# Patient Record
Sex: MALE | Race: WHITE | Employment: OTHER | URBAN - METROPOLITAN AREA
[De-identification: names, ages, dates, MRNs, and addresses within clinical notes are randomized per-mention and may not be internally consistent; named-entity substitution may affect disease eponyms.]

---

## 2019-12-17 ENCOUNTER — OFFICE VISIT (OUTPATIENT)
Dept: ORTHOPEDIC SURGERY | Age: 44
End: 2019-12-17
Payer: COMMERCIAL

## 2019-12-17 VITALS — WEIGHT: 230 LBS

## 2019-12-17 DIAGNOSIS — M67.51 SYNOVIAL PLICA SYNDROME OF RIGHT KNEE: Primary | ICD-10-CM

## 2019-12-17 DIAGNOSIS — M79.4 HYPERTROPHY OF FAT PAD OF KNEE: ICD-10-CM

## 2019-12-17 PROCEDURE — 99213 OFFICE O/P EST LOW 20 MIN: CPT | Performed by: PHYSICIAN ASSISTANT

## 2019-12-17 ASSESSMENT — ENCOUNTER SYMPTOMS
CONSTIPATION: 0
DIARRHEA: 0
CHEST TIGHTNESS: 0
COLOR CHANGE: 0
ABDOMINAL DISTENTION: 0
VOMITING: 0
SHORTNESS OF BREATH: 0
ABDOMINAL PAIN: 0
COUGH: 0
NAUSEA: 0
APNEA: 0

## 2019-12-23 ENCOUNTER — TELEPHONE (OUTPATIENT)
Dept: ORTHOPEDIC SURGERY | Age: 44
End: 2019-12-23

## 2019-12-24 ENCOUNTER — OFFICE VISIT (OUTPATIENT)
Dept: ORTHOPEDIC SURGERY | Age: 44
End: 2019-12-24
Payer: COMMERCIAL

## 2019-12-24 VITALS
BODY MASS INDEX: 31.81 KG/M2 | HEART RATE: 93 BPM | DIASTOLIC BLOOD PRESSURE: 88 MMHG | HEIGHT: 73 IN | SYSTOLIC BLOOD PRESSURE: 122 MMHG | WEIGHT: 240 LBS

## 2019-12-24 DIAGNOSIS — M67.51 SYNOVIAL PLICA SYNDROME OF RIGHT KNEE: Primary | ICD-10-CM

## 2019-12-24 DIAGNOSIS — M79.4 HYPERTROPHY OF FAT PAD OF KNEE: ICD-10-CM

## 2019-12-24 PROCEDURE — 20611 DRAIN/INJ JOINT/BURSA W/US: CPT | Performed by: PHYSICIAN ASSISTANT

## 2019-12-24 RX ORDER — METHYLPREDNISOLONE ACETATE 40 MG/ML
40 INJECTION, SUSPENSION INTRA-ARTICULAR; INTRALESIONAL; INTRAMUSCULAR; SOFT TISSUE ONCE
Status: COMPLETED | OUTPATIENT
Start: 2019-12-24 | End: 2019-12-26

## 2019-12-24 RX ORDER — LIDOCAINE HYDROCHLORIDE 10 MG/ML
4 INJECTION, SOLUTION INFILTRATION; PERINEURAL ONCE
Status: COMPLETED | OUTPATIENT
Start: 2019-12-24 | End: 2019-12-26

## 2019-12-24 SDOH — HEALTH STABILITY: MENTAL HEALTH: HOW OFTEN DO YOU HAVE A DRINK CONTAINING ALCOHOL?: NEVER

## 2019-12-24 ASSESSMENT — ENCOUNTER SYMPTOMS
DIARRHEA: 0
NAUSEA: 0
COUGH: 0
ABDOMINAL PAIN: 0
ABDOMINAL DISTENTION: 0
SHORTNESS OF BREATH: 0
CHEST TIGHTNESS: 0
VOMITING: 0
COLOR CHANGE: 0
CONSTIPATION: 0
APNEA: 0

## 2019-12-26 RX ADMIN — LIDOCAINE HYDROCHLORIDE 4 ML: 10 INJECTION, SOLUTION INFILTRATION; PERINEURAL at 06:22

## 2019-12-26 RX ADMIN — METHYLPREDNISOLONE ACETATE 40 MG: 40 INJECTION, SUSPENSION INTRA-ARTICULAR; INTRALESIONAL; INTRAMUSCULAR; SOFT TISSUE at 06:22

## 2021-07-06 ENCOUNTER — OFFICE VISIT (OUTPATIENT)
Dept: ORTHOPEDIC SURGERY | Age: 46
End: 2021-07-06
Payer: COMMERCIAL

## 2021-07-06 VITALS
HEART RATE: 93 BPM | DIASTOLIC BLOOD PRESSURE: 80 MMHG | SYSTOLIC BLOOD PRESSURE: 120 MMHG | TEMPERATURE: 98.2 F | RESPIRATION RATE: 13 BRPM | HEIGHT: 73 IN | BODY MASS INDEX: 33 KG/M2 | WEIGHT: 249 LBS

## 2021-07-06 DIAGNOSIS — S83.91XD SPRAIN OF RIGHT KNEE, UNSPECIFIED LIGAMENT, SUBSEQUENT ENCOUNTER: ICD-10-CM

## 2021-07-06 DIAGNOSIS — M25.561 RIGHT KNEE PAIN, UNSPECIFIED CHRONICITY: Primary | ICD-10-CM

## 2021-07-06 DIAGNOSIS — M67.51 PLICA SYNDROME OF RIGHT KNEE: Primary | ICD-10-CM

## 2021-07-06 PROCEDURE — 99213 OFFICE O/P EST LOW 20 MIN: CPT | Performed by: PHYSICIAN ASSISTANT

## 2021-07-06 ASSESSMENT — ENCOUNTER SYMPTOMS
NAUSEA: 0
COLOR CHANGE: 0
ABDOMINAL PAIN: 0
CONSTIPATION: 0
SHORTNESS OF BREATH: 0
CHEST TIGHTNESS: 0
COUGH: 0
APNEA: 0
ABDOMINAL DISTENTION: 0
DIARRHEA: 0
VOMITING: 0
RESPIRATORY NEGATIVE: 1

## 2021-07-06 NOTE — PROGRESS NOTES
[place] [id number] AND SPORTS MEDICINE  [address] [place] [address] [address] [address]  Dept: [phone]  Dept Fax: [phone]          Right Knee - Follow Up- [facility] injury    Subjective:     Chief Complaint   Patient presents with    Follow-up     R Knee Pain        CLAIM#: [id number]  DOI: 08/21/2003  DIAG: S73.101A Sprain hip & Thigh              S83. 91XA Sprain of knee & leg              S83. 8X1A Sprain or strain quad                C22.18 Plica syndroe              C75.5 Scar & Fibrosis               M79.4 Hypertrophy of fat pad  HPI:     Chela He presents today for Right knee pain. The pain has been present since 08/21/2003. The patient recalls a [facility] specific injury where several hundred pounds and he ruptured his quadricep tendon. The patient has tried ibuprofen, a brace, heat, ice and rest with no pain relief. The pain is now described as throbbing and achy. There is pain on weight bearing. The knee has swelled. There is painful popping and clicking. The knee has caught or locked up. The knee has not given out. It is stiff upon arising from sitting. It is painful to go up and down stairs and sit for a prolonged time. Patient states that the pain has stopped him from playing sports, working out, cutting his lawn and getting a good nights sleep. The patient has had a cortisone injection on 12/17/2019 with good pain relief. The patient has not tried a lubrication injection. The patient has tried physical therapy and he does home exercises with mild improvement. The patient has had surgery in 2005, not by Dr. Iraj Vega. The opposite knee is okay. He been having increased knee pain. He was unable to come in due to Covid. He did do an independent medical exam by a physician that was suggested by his . I do not have any records of that appointment. ROS:   Review of Systems   Constitutional: Positive for activity change.  Negative for appetite change, fatigue and fever. Respiratory: Negative. Negative for apnea, cough, chest tightness and shortness of breath. Cardiovascular: Negative. Negative for chest pain, palpitations and leg swelling. Gastrointestinal: Negative for abdominal distention, abdominal pain, constipation, diarrhea, nausea and vomiting. Genitourinary: Negative for difficulty urinating, dysuria and hematuria. Musculoskeletal: Positive for arthralgias. Negative for gait problem, joint swelling and myalgias. Skin: Negative for color change and rash. Neurological: Negative for dizziness, weakness, numbness and headaches. Psychiatric/Behavioral: Negative for sleep disturbance. Past Medical History:    No past medical history on file. Past Surgical History:    No past surgical history on file. CurrentMedications:   No current outpatient medications on file. No current facility-administered medications for this visit. Allergies:    Penicillins    Social History:   Social History     Socioeconomic History    Marital status:      Spouse name: None    Number of children: None    Years of education: None    Highest education level: None   Occupational History    None   Tobacco Use    Smoking status: Never Smoker    Smokeless tobacco: Never Used   Vaping Use    Vaping Use: Never used   Substance and Sexual Activity    Alcohol use: Never    Drug use: Never    Sexual activity: None   Other Topics Concern    None   Social History Narrative    None     Social Determinants of Health     Financial Resource Strain:     Difficulty of Paying Living Expenses:    Food Insecurity:     Worried About Running Out of Food in the Last Year:     Ran Out of Food in the Last Year:    Transportation Needs:     Lack of Transportation (Medical):      Lack of Transportation (Non-Medical):    Physical Activity:     Days of Exercise per Week:     Minutes of Exercise per Session:    Stress:     Feeling of Stress :    Social Plica syndrome of right knee    2. Sprain of right knee, unspecified ligament, subsequent encounter      Procedures:    Procedure: no  Radiology:   KNEE X-RAY    4 views of the right knee including AP, bilateral tunnel, and lateral in the upright position, and skyline views reveal anatomic alignment with no fracture or dislocation. Kellgren grade II changes of osteoarthritis (joint space narrowing, osteophyte, subchondral sclerosis, bony deformity/cyst) of the tricompartment(s). No osseous loose bodies. No bony erosion or periosteal reaction. No soft tissue masses. Spurring of the lateral pole of the patella. Impression: Mild degenerative changes of the right knee. Plan:   Treatment : I reviewed the X-ray with the patient and I informed them that the x-ray looks similar to his previous x-ray. We discussed the etiologies and natural histories of plica pain and mild degenerative changes of the right knee. We discussed the various treatment alternatives including anti-inflammatory medications, physical therapy, injections, further imaging studies and as a last result surgery. During today's visit, we discussed that he is having more pain in his knee and swelling. He is able to do less activities due to his knee pain. 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 Since has been many years since we have had an MRI of his right knee I think it be reasonable to admit a C9 for a new MRI of his right knee to see if he is any more degenerative changes of his kneecap due to his quadricep tendon tear. We can also put in another C9 for a cortisone injection into the right knee so depending on what the results of the MRI are we could consider doing another cortisone injection which does provide relief for short period of time. He is concerned about continuing to have cortisone injection into the knee in case it causes damage. The patient has opted for submitting to C9's.   One for a cortisone injection and a second for a new MRI. When you have had a traumatic knee injury that caused a tendon tear in the knee that could lead to tracking issues of the kneecap which could then in turn cause increased arthritis of the patellofemoral joint. A physical therapy prescription was not given. A Rx was not given. Patient should return to the clinic after his MRI to follow up with Elsa Gauthier D.O. . The patient will call the office immediately with any problems. No orders of the defined types were placed in this encounter. No orders of the defined types were placed in this encounter. Adela Sewell PA-C, have personally seen this patient, reviewed the chart including history, and imaging if done. I personally  performed the physical exam and obtained any needed additional history. I placed orders, performed or supervised procedures and developed the treatment plan.     Electronically signed by Anna Kemp PA-C, on 7/6/2021 at 4:36 PM      Electronically signed by Anna Kemp PA-C, on 7/6/2021 at 4:33 PM

## 2021-08-03 ENCOUNTER — TELEPHONE (OUTPATIENT)
Dept: ORTHOPEDIC SURGERY | Age: 46
End: 2021-08-03

## 2021-08-03 ENCOUNTER — PROCEDURE VISIT (OUTPATIENT)
Dept: ORTHOPEDIC SURGERY | Age: 46
End: 2021-08-03
Payer: COMMERCIAL

## 2021-08-03 VITALS
HEART RATE: 94 BPM | RESPIRATION RATE: 14 BRPM | HEIGHT: 73 IN | DIASTOLIC BLOOD PRESSURE: 81 MMHG | WEIGHT: 249 LBS | BODY MASS INDEX: 33 KG/M2 | SYSTOLIC BLOOD PRESSURE: 127 MMHG

## 2021-08-03 DIAGNOSIS — M67.51 PLICA SYNDROME OF RIGHT KNEE: Primary | ICD-10-CM

## 2021-08-03 PROCEDURE — 20611 DRAIN/INJ JOINT/BURSA W/US: CPT | Performed by: PHYSICIAN ASSISTANT

## 2021-08-03 RX ORDER — LIDOCAINE HYDROCHLORIDE 10 MG/ML
4 INJECTION, SOLUTION INFILTRATION; PERINEURAL ONCE
Status: COMPLETED | OUTPATIENT
Start: 2021-08-03 | End: 2021-08-03

## 2021-08-03 RX ORDER — METHYLPREDNISOLONE ACETATE 40 MG/ML
40 INJECTION, SUSPENSION INTRA-ARTICULAR; INTRALESIONAL; INTRAMUSCULAR; SOFT TISSUE ONCE
Status: COMPLETED | OUTPATIENT
Start: 2021-08-03 | End: 2021-08-03

## 2021-08-03 RX ADMIN — METHYLPREDNISOLONE ACETATE 40 MG: 40 INJECTION, SUSPENSION INTRA-ARTICULAR; INTRALESIONAL; INTRAMUSCULAR; SOFT TISSUE at 12:52

## 2021-08-03 RX ADMIN — LIDOCAINE HYDROCHLORIDE 4 ML: 10 INJECTION, SOLUTION INFILTRATION; PERINEURAL at 12:52

## 2021-08-03 NOTE — PROGRESS NOTES
[address] AND SPORTS MEDICINE  [address] [place] [id number]  Dept: [phone]  Dept Fax: [phone]          Right knee Visit - Follow up    Subjective:     Chief Complaint   Patient presents with    Procedure     R Knee Injection     HPI:     Amy Evangelista presents today for Rightknee pain. Patient is here today for cortisone injections into Right knee. He had his last cortisone injection was on 12/17/2019. He has not had a lubrication injection. I have reviewed the CC, and if not present in this note, I have reviewed in the patient's chart. I agree with the documentation provided by other staff and have reviewed their documentation prior to providing my signature indicating agreement. Vitals:   /81   Pulse 94   Resp 14   Ht 6' 1\" (1.854 m)   Wt 249 lb (112.9 kg)   BMI 32.85 kg/m²  Body mass index is 32.85 kg/m². Assessment:     1. Plica syndrome of right knee          Procedure:   Procedure: Yes    Regular Knee Injection    Location: Right Knee  Procedure: I discussed in detail the risks, benefits and complications of the corticosteroid injection which included but are not limited to: infection, skin reactions, hot swollen, and anaphylaxis with the patient. Amy Evangelista verbalized understanding and they have agreed to have the corticosteroid injection into the right knee. The patient was placed in the Supine position on the exam table. The superior lateral portal was identified and marked with a ball point pen. The skin was prepped with betadine in a sterile fashion. Utilizing a [employer] ultrasound unit with a variable frequency linear transducer and clean technique with sterile gloves, a 5 cc solution containing 4 cc of 1.0% Lidocaine with 1 cc containing 40 mg of Depo-medrol  was injected. There was no resistance to the injection. The wound was cleansed and a band-aid was placed.  the patient tolerated the procedure without difficulty. Adverse reactions to the injection were discussed with the patient including signs of infection (increasing pain, redness, swelling) and the patient was instructed to call immediately if experiencing any of these symptoms. Images of the injection site were recorded throughout the procedure and are saved on the SD card which is stored in the [employer] ultrasound unit. All images were downloaded and stored in patient's chart. Plan:   Patient should return to the clinic in after his MRI to follow up with Dr. Brittany Blunt or Dr. Price Farfan. The patient will call the office immediately with any problems. Orders Placed This Encounter   Medications    lidocaine 1 % injection 4 mL    methylPREDNISolone acetate (DEPO-MEDROL) injection 40 mg       No orders of the defined types were placed in this encounter.           Electronically signed by Molina Amaral PA-C, on 8/3/2021 at 12:57 PM

## 2021-08-04 DIAGNOSIS — M67.51 PLICA SYNDROME OF RIGHT KNEE: Primary | ICD-10-CM

## 2021-08-04 DIAGNOSIS — S83.91XD SPRAIN OF RIGHT KNEE, UNSPECIFIED LIGAMENT, SUBSEQUENT ENCOUNTER: ICD-10-CM

## 2021-08-13 ENCOUNTER — HOSPITAL ENCOUNTER (OUTPATIENT)
Dept: MRI IMAGING | Facility: CLINIC | Age: 46
Discharge: HOME | End: 2021-08-15
Payer: COMMERCIAL

## 2021-08-13 DIAGNOSIS — M67.51 PLICA SYNDROME OF RIGHT KNEE: ICD-10-CM

## 2021-08-13 DIAGNOSIS — S83.91XD SPRAIN OF RIGHT KNEE, UNSPECIFIED LIGAMENT, SUBSEQUENT ENCOUNTER: ICD-10-CM

## 2021-08-13 PROCEDURE — 73721 MRI JNT OF LWR EXTRE W/O DYE: CPT

## 2021-08-13 NOTE — TELEPHONE ENCOUNTER
----- Message from Bandar Camacho PA-C sent at 8/13/2021 12:26 PM EDT -----  [facility]- needs an MRI with Dr. Claudine Melendrez

## 2021-08-13 NOTE — TELEPHONE ENCOUNTER
----- Message from Yoandy Chung PA-C sent at 8/13/2021 12:26 PM EDT -----  [facility]- needs an MRI with Dr. Antonio Schneider

## 2021-08-13 NOTE — TELEPHONE ENCOUNTER
Had a long discussion with Esha Rausch. He would like an appointment with [facility] to discuss his MR and how to proceed. He is not comfortable making an appointment with another provider at this time. Appt made for next week to discuss further recommendations.

## 2021-08-17 ENCOUNTER — OFFICE VISIT (OUTPATIENT)
Dept: ORTHOPEDIC SURGERY | Age: 46
End: 2021-08-17
Payer: COMMERCIAL

## 2021-08-17 VITALS
WEIGHT: 249 LBS | RESPIRATION RATE: 14 BRPM | HEART RATE: 90 BPM | BODY MASS INDEX: 33 KG/M2 | SYSTOLIC BLOOD PRESSURE: 126 MMHG | DIASTOLIC BLOOD PRESSURE: 89 MMHG | HEIGHT: 73 IN

## 2021-08-17 DIAGNOSIS — M79.4 HYPERTROPHY OF FAT PAD OF KNEE: ICD-10-CM

## 2021-08-17 DIAGNOSIS — L90.5 SCAR CONDITION AND FIBROSIS OF SKIN: ICD-10-CM

## 2021-08-17 DIAGNOSIS — S83.91XD SPRAIN OF RIGHT KNEE, UNSPECIFIED LIGAMENT, SUBSEQUENT ENCOUNTER: Primary | ICD-10-CM

## 2021-08-17 PROCEDURE — 99214 OFFICE O/P EST MOD 30 MIN: CPT | Performed by: PHYSICIAN ASSISTANT

## 2021-08-17 ASSESSMENT — ENCOUNTER SYMPTOMS
APNEA: 0
NAUSEA: 0
COUGH: 0
CHEST TIGHTNESS: 0
SHORTNESS OF BREATH: 0
ABDOMINAL DISTENTION: 0
COLOR CHANGE: 0
VOMITING: 0
RESPIRATORY NEGATIVE: 1
DIARRHEA: 0
CONSTIPATION: 0
ABDOMINAL PAIN: 0

## 2021-08-17 NOTE — PROGRESS NOTES
morning. He is here today for an MRI review. ROS:   Review of Systems   Constitutional: Positive for activity change. Negative for appetite change, fatigue and fever. Respiratory: Negative. Negative for apnea, cough, chest tightness and shortness of breath. Cardiovascular: Negative. Negative for chest pain, palpitations and leg swelling. Gastrointestinal: Negative for abdominal distention, abdominal pain, constipation, diarrhea, nausea and vomiting. Genitourinary: Negative for difficulty urinating, dysuria and hematuria. Musculoskeletal: Positive for arthralgias and gait problem. Negative for joint swelling and myalgias. Skin: Negative for color change and rash. Neurological: Negative for dizziness, weakness, numbness and headaches. Psychiatric/Behavioral: Positive for sleep disturbance. Past Medical History:    No past medical history on file. Past Surgical History:    No past surgical history on file. CurrentMedications:   No current outpatient medications on file. No current facility-administered medications for this visit. Allergies:    Penicillins    Social History:   Social History     Socioeconomic History    Marital status:      Spouse name: None    Number of children: None    Years of education: None    Highest education level: None   Occupational History    None   Tobacco Use    Smoking status: Never Smoker    Smokeless tobacco: Never Used   Vaping Use    Vaping Use: Never used   Substance and Sexual Activity    Alcohol use: Never    Drug use: Never    Sexual activity: None   Other Topics Concern    None   Social History Narrative    None     Social Determinants of Health     Financial Resource Strain:     Difficulty of Paying Living Expenses:    Food Insecurity:     Worried About Running Out of Food in the Last Year:     Ran Out of Food in the Last Year:    Transportation Needs:     Lack of Transportation (Medical):      Lack of Transportation (Non-Medical):    Physical Activity:     Days of Exercise per Week:     Minutes of Exercise per Session:    Stress:     Feeling of Stress :    Social Connections:     Frequency of Communication with Friends and Family:     Frequency of Social Gatherings with Friends and Family:     Attends [employer] Services:     Active Member of Clubs or Organizations:     Attends Club or Organization Meetings:     Marital Status:    Intimate Partner Violence:     Fear of Current or Ex-Partner:     Emotionally Abused:     Physically Abused:     Sexually Abused:        Family History:  No family history on file. Vitals:   /89   Pulse 90   Resp 14   Ht 6' 1\" (1.854 m)   Wt 249 lb (112.9 kg)   BMI 32.85 kg/m²  Body mass index is 32.85 kg/m². Physical Examination:     Orthopedics:    GENERAL: Alert and oriented X3 in no acute distress. SKIN: Intact without lesions or ulcerations. NEURO: Intact to sensory and motor testing. VASC: Capillary refill is less than 3 seconds. KNEE EXAM    LOCATION: Right Knee  GEN: Alert and oriented X 3, in no acute distress. GAIT: The patient's gait was observed while entering the exam room and was noted to be antalgic. The extremity is in anatomic alignment. SKIN: Intact without rashes, lesions, or ulcerations. No obvious deformity or swelling. NEURO: The patient responds to light touch throughout bilateral LE. Patellar and Achilles reflexes are 2/4. VASC: The bilateral LE is neurovascularly intact with 2/4 DP and 2/4 PT pulses. Brisk capillary refill. ROM: 0/100 degrees. There is mild effusion. MUSC: decreased quad tone  LIGAMENT: Lachman's test is Negative with Good endpoint. Anterior drawer Negative. Posterior drawer Negative. There is No varus instability at 0 degrees and No varus instability at 30 degrees. There is No valgus instability at 0 degrees and No valgus instability at 30 degrees.   SPECIAL: Sonia test is negative with no clunks, no No acute fracture or dislocation. Mild tricompartmental osteophyte spurring. Articular cartilage of the lateral compartment appears grossly intact without focal chondral defect. Diffuse grade 2 medial compartment chondromalacia. Diffuse grade 2-3 patellofemoral chondromalacia. BONE MARROW: Bone marrow signal intensity within the visualized osseous structures is within normal limits. SOFT TISSUES: Mild edema in the subcutaneous fat about the knee. No sizable Baker's cyst.  Fluid in the popliteus hiatus. Visualized popliteal neurovascular bundle grossly unremarkable. 1. Thinning of the ACL which appears continuous without complete tear. This could be related to remote injury. 2. No meniscal tear. 3. Small joint effusion. 4. Mild tricompartmental osteoarthrosis. Mild-to-moderate patellofemoral chondromalacia. Mild medial compartment chondromalacia. 5. Mild edema in the subcutaneous fat about the knee. Fluid in the popliteus hiatus. 6. Mild multifocal patellar tendinosis with low-grade partial-thickness interstitial tearing of the superior patellar tendon. 7. Mild distal quadriceps tendinosis. Plan:   Treatment : I reviewed the x-ray and MRI with the patient and I informed them that the MRI shows patellar tendinitis and mild arthritis. We discussed the etiologies and natural histories of patellar tendinitis in mild osteoarthritis. We discussed the various treatment alternatives including anti-inflammatory medications, physical therapy, injections, further imaging studies and as a last result surgery. During today's visit, we discussed that he has pain of his patellar tendon and patellar tendinitis. Majority of his pain seems to be in the anterior of the knee. He has developed some chondromalacia behind the kneecap most likely from his injury to his patellar tendon. I do not believe there is anything surgical that needs to be done with the knee at this time.   I offered physical therapy but the patient is not interested in doing that because that usually aggravates his knee. The patient is not comfortable following up with any other provider except for Dr. Marck Dickinson. He would like to wait and follow-up with Dr. Marck Dickinson at a later date. He can continue doing nonsteroidal anti-inflammatories to help with the pain. I am not sure continue to do cortisone shots would be effective since he really gets very little relief. He will continue working as he is currently doing. The patient will call the office immediately with any problems. No orders of the defined types were placed in this encounter. No orders of the defined types were placed in this encounter.         Electronically signed by Crystal Rodgers PA-C, on 8/17/2021 at 8:49 AM

## 2022-08-30 ENCOUNTER — HOSPITAL ENCOUNTER (OUTPATIENT)
Dept: GENERAL RADIOLOGY | Age: 47
Discharge: HOME | End: 2022-09-01

## 2022-08-30 ENCOUNTER — HOSPITAL ENCOUNTER (OUTPATIENT)
Age: 47
Discharge: HOME | End: 2022-08-30

## 2022-08-30 DIAGNOSIS — Z00.00 PHYSICAL EXAM: ICD-10-CM

## 2022-08-30 LAB
ABSOLUTE EOS #: 0.29 K/UL (ref 0–0.44)
ABSOLUTE IMMATURE GRANULOCYTE: 0.04 K/UL (ref 0–0.3)
ABSOLUTE LYMPH #: 1.25 K/UL (ref 1.1–3.7)
ABSOLUTE MONO #: 1.01 K/UL (ref 0.1–1.2)
ALBUMIN SERPL-MCNC: 4.5 G/DL (ref 3.5–5.2)
ALBUMIN/GLOBULIN RATIO: 1.7 (ref 1–2.5)
ALP BLD-CCNC: 41 U/L (ref 40–129)
ALT SERPL-CCNC: 37 U/L (ref 5–41)
ANION GAP SERPL CALCULATED.3IONS-SCNC: 12 MMOL/L (ref 9–17)
AST SERPL-CCNC: 28 U/L
BASOPHILS # BLD: 1 % (ref 0–2)
BASOPHILS ABSOLUTE: 0.08 K/UL (ref 0–0.2)
BILIRUB SERPL-MCNC: 0.35 MG/DL (ref 0.3–1.2)
BUN BLDV-MCNC: 20 MG/DL (ref 6–20)
CALCIUM SERPL-MCNC: 9.2 MG/DL (ref 8.6–10.4)
CHLORIDE BLD-SCNC: 103 MMOL/L (ref 98–107)
CHOLESTEROL/HDL RATIO: 3.5
CHOLESTEROL: 166 MG/DL
CO2: 23 MMOL/L (ref 20–31)
CREAT SERPL-MCNC: 0.84 MG/DL (ref 0.7–1.2)
EOSINOPHILS RELATIVE PERCENT: 4 % (ref 1–4)
GFR AFRICAN AMERICAN: >60 ML/MIN
GFR NON-AFRICAN AMERICAN: >60 ML/MIN
GFR SERPL CREATININE-BSD FRML MDRD: ABNORMAL ML/MIN/{1.73_M2}
GLUCOSE BLD-MCNC: 125 MG/DL (ref 70–99)
HCT VFR BLD CALC: 41.5 % (ref 40.7–50.3)
HDLC SERPL-MCNC: 48 MG/DL
HEMOGLOBIN: 13.7 G/DL (ref 13–17)
IMMATURE GRANULOCYTES: 1 %
LDL CHOLESTEROL: 81 MG/DL (ref 0–130)
LYMPHOCYTES # BLD: 18 % (ref 24–43)
MCH RBC QN AUTO: 29.5 PG (ref 25.2–33.5)
MCHC RBC AUTO-ENTMCNC: 33 G/DL (ref 28.4–34.8)
MCV RBC AUTO: 89.2 FL (ref 82.6–102.9)
MONOCYTES # BLD: 15 % (ref 3–12)
NRBC AUTOMATED: 0 PER 100 WBC
PDW BLD-RTO: 12.4 % (ref 11.8–14.4)
PLATELET # BLD: 272 K/UL (ref 138–453)
PMV BLD AUTO: 10.4 FL (ref 8.1–13.5)
POTASSIUM SERPL-SCNC: 4.5 MMOL/L (ref 3.7–5.3)
PROSTATE SPECIFIC ANTIGEN: 0.29 NG/ML
RBC # BLD: 4.65 M/UL (ref 4.21–5.77)
SEG NEUTROPHILS: 61 % (ref 36–65)
SEGMENTED NEUTROPHILS ABSOLUTE COUNT: 4.18 K/UL (ref 1.5–8.1)
SODIUM BLD-SCNC: 138 MMOL/L (ref 135–144)
TOTAL PROTEIN: 7.1 G/DL (ref 6.4–8.3)
TRIGL SERPL-MCNC: 186 MG/DL
WBC # BLD: 6.9 K/UL (ref 3.5–11.3)

## 2022-08-30 PROCEDURE — 71045 X-RAY EXAM CHEST 1 VIEW: CPT

## 2022-08-30 PROCEDURE — G0103 PSA SCREENING: HCPCS

## 2022-08-30 PROCEDURE — 85025 COMPLETE CBC W/AUTO DIFF WBC: CPT

## 2022-08-30 PROCEDURE — 36415 COLL VENOUS BLD VENIPUNCTURE: CPT

## 2022-08-30 PROCEDURE — 80061 LIPID PANEL: CPT

## 2022-08-30 PROCEDURE — 80053 COMPREHEN METABOLIC PANEL: CPT

## 2023-03-20 DIAGNOSIS — M25.561 RIGHT KNEE PAIN, UNSPECIFIED CHRONICITY: Primary | ICD-10-CM

## 2023-03-21 ENCOUNTER — OFFICE VISIT (OUTPATIENT)
Dept: ORTHOPEDIC SURGERY | Age: 48
End: 2023-03-21

## 2023-03-21 VITALS — HEIGHT: 73 IN | BODY MASS INDEX: 30.75 KG/M2 | RESPIRATION RATE: 16 BRPM | WEIGHT: 232 LBS

## 2023-03-21 DIAGNOSIS — S83.91XD SPRAIN OF RIGHT KNEE, UNSPECIFIED LIGAMENT, SUBSEQUENT ENCOUNTER: Primary | ICD-10-CM

## 2023-03-21 DIAGNOSIS — M76.51 PATELLAR TENDINITIS, RIGHT KNEE: ICD-10-CM

## 2023-03-21 DIAGNOSIS — M79.4 HYPERTROPHY OF FAT PAD OF KNEE: ICD-10-CM

## 2023-03-21 DIAGNOSIS — L90.5 SCAR CONDITION AND FIBROSIS OF SKIN: ICD-10-CM

## 2023-03-21 ASSESSMENT — ENCOUNTER SYMPTOMS
ABDOMINAL PAIN: 0
CONSTIPATION: 0
APNEA: 0
COUGH: 0
DIARRHEA: 0
CHEST TIGHTNESS: 0
NAUSEA: 0
VOMITING: 0
RESPIRATORY NEGATIVE: 1
ABDOMINAL DISTENTION: 0
SHORTNESS OF BREATH: 0
COLOR CHANGE: 0

## 2023-03-21 NOTE — PROGRESS NOTES
pain daily and he is trying to learn to manage it but recently the pain is gotten significantly worse. The patient did have a patella tendon repair after his work injury in 2005. The surgery was not done by Dr. Duane Darling in the patient does not remember the doctor's name. Review of Systems   Constitutional:  Positive for activity change. Negative for appetite change, fatigue and fever. Respiratory: Negative. Negative for apnea, cough, chest tightness and shortness of breath. Cardiovascular: Negative. Negative for chest pain, palpitations and leg swelling. Gastrointestinal:  Negative for abdominal distention, abdominal pain, constipation, diarrhea, nausea and vomiting. Genitourinary:  Negative for difficulty urinating, dysuria and hematuria. Musculoskeletal:  Positive for arthralgias, gait problem and joint swelling. Negative for myalgias. Skin:  Negative for color change and rash. Neurological:  Negative for dizziness, weakness, numbness and headaches. Psychiatric/Behavioral:  Positive for sleep disturbance. Objective :   Resp 16   Ht 6' 1\" (1.854 m)   Wt 232 lb (105.2 kg)   BMI 30.61 kg/m²  Body mass index is 30.61 kg/m². General: Adelaida Villatoro is a 52 y.o. male who is alert and oriented and sitting comfortably in our office. Orthopedics:    GENERAL: Alert and oriented X3 in no acute distress. SKIN: Intact without lesions or ulcerations. NEURO: Musculoskeletal and axillary nerves intact to sensory and motor testing. VASC: Capillary refill is less than 3 seconds. Knee Exam    LOCATION: Right knee  GEN: Alert and oriented X 3, in no acute distress. GAIT: The patient's gait was observed while entering the exam room and was noted to be  antalgic. The extremity is in anatomic alignment. SKIN: Intact without rashes, lesions, or ulcerations. No obvious deformity or swelling. NEURO: The patient responds to light touch throughout bilateral LE.  Patellar and Achilles reflexes

## 2023-07-10 ENCOUNTER — TELEPHONE (OUTPATIENT)
Dept: ORTHOPEDIC SURGERY | Age: 48
End: 2023-07-10

## 2023-07-10 NOTE — TELEPHONE ENCOUNTER
Writer was brought paperwork by registration staff stating patient needs extension on [facility] C-9 injection approval.      Sent Cover sheet to care coordinator requesting extension on R Knee Cortisone injection from 3/24/23.

## 2023-07-12 NOTE — TELEPHONE ENCOUNTER
Extension approval received & scanned into media.       LVM for patient to [facility] & Schedule R Knee Injection with Daved Minor by end of July

## 2023-07-18 ENCOUNTER — OFFICE VISIT (OUTPATIENT)
Dept: ORTHOPEDIC SURGERY | Age: 48
End: 2023-07-18

## 2023-07-18 VITALS — HEIGHT: 73 IN | OXYGEN SATURATION: 98 % | RESPIRATION RATE: 16 BRPM | WEIGHT: 232.2 LBS | BODY MASS INDEX: 30.77 KG/M2

## 2023-07-18 DIAGNOSIS — M79.4 HYPERTROPHY OF FAT PAD OF KNEE: ICD-10-CM

## 2023-07-18 DIAGNOSIS — M25.561 RIGHT KNEE PAIN, UNSPECIFIED CHRONICITY: Primary | ICD-10-CM

## 2023-07-18 DIAGNOSIS — S83.91XD SPRAIN OF RIGHT KNEE, UNSPECIFIED LIGAMENT, SUBSEQUENT ENCOUNTER: ICD-10-CM

## 2023-07-18 RX ORDER — LIDOCAINE HYDROCHLORIDE 10 MG/ML
2 INJECTION, SOLUTION INFILTRATION; PERINEURAL ONCE
Status: COMPLETED | OUTPATIENT
Start: 2023-07-18 | End: 2023-07-18

## 2023-07-18 RX ORDER — METHYLPREDNISOLONE ACETATE 80 MG/ML
80 INJECTION, SUSPENSION INTRA-ARTICULAR; INTRALESIONAL; INTRAMUSCULAR; SOFT TISSUE ONCE
Status: COMPLETED | OUTPATIENT
Start: 2023-07-18 | End: 2023-07-18

## 2023-07-18 RX ADMIN — LIDOCAINE HYDROCHLORIDE 2 ML: 10 INJECTION, SOLUTION INFILTRATION; PERINEURAL at 14:15

## 2023-07-18 RX ADMIN — METHYLPREDNISOLONE ACETATE 80 MG: 80 INJECTION, SUSPENSION INTRA-ARTICULAR; INTRALESIONAL; INTRAMUSCULAR; SOFT TISSUE at 14:16

## 2023-09-05 ENCOUNTER — TELEPHONE (OUTPATIENT)
Dept: ORTHOPEDIC SURGERY | Age: 48
End: 2023-09-05

## 2023-09-05 NOTE — TELEPHONE ENCOUNTER
Pt of Olya James -  last seen for a [facility] visit 2023  rt knee -- she had issued a handicap placard previously for 5 yr - that will be  on 2023 .,  Pt is req a renewal for same length of time if poss.   Please advise pt when ready for pickup   Respectfully/bb

## 2023-09-07 DIAGNOSIS — L90.5 SCAR CONDITION AND FIBROSIS OF SKIN: ICD-10-CM

## 2023-09-07 DIAGNOSIS — M76.51 PATELLAR TENDINITIS, RIGHT KNEE: ICD-10-CM

## 2023-09-07 DIAGNOSIS — S83.91XD SPRAIN OF RIGHT KNEE, UNSPECIFIED LIGAMENT, SUBSEQUENT ENCOUNTER: ICD-10-CM

## 2023-09-07 DIAGNOSIS — M79.4 HYPERTROPHY OF FAT PAD OF KNEE: Primary | ICD-10-CM

## 2023-09-07 NOTE — TELEPHONE ENCOUNTER
Handicap placard has been prepared. Called patient and let him know that it is ready for pickup. He stated he would be by our office tomorrow sometime.